# Patient Record
Sex: FEMALE | Race: WHITE | NOT HISPANIC OR LATINO | Employment: UNEMPLOYED | URBAN - METROPOLITAN AREA
[De-identification: names, ages, dates, MRNs, and addresses within clinical notes are randomized per-mention and may not be internally consistent; named-entity substitution may affect disease eponyms.]

---

## 2018-08-27 ENCOUNTER — HOSPITAL ENCOUNTER (INPATIENT)
Facility: MEDICAL CENTER | Age: 43
LOS: 2 days | DRG: 281 | End: 2018-08-29
Attending: EMERGENCY MEDICINE | Admitting: HOSPITALIST
Payer: COMMERCIAL

## 2018-08-27 DIAGNOSIS — R73.9 HYPERGLYCEMIA: ICD-10-CM

## 2018-08-27 DIAGNOSIS — I21.4 NSTEMI (NON-ST ELEVATED MYOCARDIAL INFARCTION) (HCC): ICD-10-CM

## 2018-08-27 PROBLEM — Z89.519 HX OF BKA (HCC): Status: ACTIVE | Noted: 2018-08-27

## 2018-08-27 PROBLEM — E11.65 TYPE 2 DIABETES MELLITUS WITH HYPERGLYCEMIA (HCC): Status: ACTIVE | Noted: 2018-08-27

## 2018-08-27 PROBLEM — N18.9 CKD (CHRONIC KIDNEY DISEASE): Status: ACTIVE | Noted: 2018-08-27

## 2018-08-27 PROBLEM — I25.10 CAD (CORONARY ARTERY DISEASE): Status: ACTIVE | Noted: 2018-08-27

## 2018-08-27 PROBLEM — I10 ESSENTIAL HYPERTENSION: Status: ACTIVE | Noted: 2018-08-27

## 2018-08-27 PROBLEM — R07.9 CHEST PAIN: Status: ACTIVE | Noted: 2018-08-27

## 2018-08-27 PROBLEM — E11.9 DM (DIABETES MELLITUS) (HCC): Status: ACTIVE | Noted: 2018-08-27

## 2018-08-27 LAB
ANION GAP SERPL CALC-SCNC: 10 MMOL/L (ref 0–11.9)
BNP SERPL-MCNC: 607 PG/ML (ref 0–100)
BUN SERPL-MCNC: 19 MG/DL (ref 8–22)
CALCIUM SERPL-MCNC: 7.6 MG/DL (ref 8.5–10.5)
CHLORIDE SERPL-SCNC: 101 MMOL/L (ref 96–112)
CO2 SERPL-SCNC: 23 MMOL/L (ref 20–33)
CREAT SERPL-MCNC: 1.25 MG/DL (ref 0.5–1.4)
EKG IMPRESSION: NORMAL
GLUCOSE SERPL-MCNC: 329 MG/DL (ref 65–99)
POTASSIUM SERPL-SCNC: 4.9 MMOL/L (ref 3.6–5.5)
SODIUM SERPL-SCNC: 134 MMOL/L (ref 135–145)
TROPONIN I SERPL-MCNC: 0.16 NG/ML (ref 0–0.04)
TROPONIN I SERPL-MCNC: 0.17 NG/ML (ref 0–0.04)

## 2018-08-27 PROCEDURE — 304561 HCHG STAT O2

## 2018-08-27 PROCEDURE — 96374 THER/PROPH/DIAG INJ IV PUSH: CPT

## 2018-08-27 PROCEDURE — 93005 ELECTROCARDIOGRAM TRACING: CPT | Performed by: EMERGENCY MEDICINE

## 2018-08-27 PROCEDURE — 83880 ASSAY OF NATRIURETIC PEPTIDE: CPT

## 2018-08-27 PROCEDURE — 80048 BASIC METABOLIC PNL TOTAL CA: CPT

## 2018-08-27 PROCEDURE — 700111 HCHG RX REV CODE 636 W/ 250 OVERRIDE (IP): Performed by: EMERGENCY MEDICINE

## 2018-08-27 PROCEDURE — 99285 EMERGENCY DEPT VISIT HI MDM: CPT

## 2018-08-27 PROCEDURE — 96375 TX/PRO/DX INJ NEW DRUG ADDON: CPT

## 2018-08-27 PROCEDURE — 770020 HCHG ROOM/CARE - TELE (206)

## 2018-08-27 PROCEDURE — 99222 1ST HOSP IP/OBS MODERATE 55: CPT | Performed by: HOSPITALIST

## 2018-08-27 PROCEDURE — 84484 ASSAY OF TROPONIN QUANT: CPT

## 2018-08-27 PROCEDURE — 36415 COLL VENOUS BLD VENIPUNCTURE: CPT

## 2018-08-27 RX ORDER — ASPIRIN 300 MG/1
300 SUPPOSITORY RECTAL DAILY
Status: DISCONTINUED | OUTPATIENT
Start: 2018-08-28 | End: 2018-08-28

## 2018-08-27 RX ORDER — BISACODYL 10 MG
10 SUPPOSITORY, RECTAL RECTAL
Status: DISCONTINUED | OUTPATIENT
Start: 2018-08-27 | End: 2018-08-29 | Stop reason: HOSPADM

## 2018-08-27 RX ORDER — ACETAMINOPHEN 325 MG/1
650 TABLET ORAL EVERY 6 HOURS PRN
Status: DISCONTINUED | OUTPATIENT
Start: 2018-08-27 | End: 2018-08-29 | Stop reason: HOSPADM

## 2018-08-27 RX ORDER — SODIUM CHLORIDE 9 MG/ML
INJECTION, SOLUTION INTRAVENOUS CONTINUOUS
Status: DISCONTINUED | OUTPATIENT
Start: 2018-08-27 | End: 2018-08-29

## 2018-08-27 RX ORDER — ONDANSETRON 2 MG/ML
4 INJECTION INTRAMUSCULAR; INTRAVENOUS ONCE
Status: COMPLETED | OUTPATIENT
Start: 2018-08-27 | End: 2018-08-27

## 2018-08-27 RX ORDER — PROMETHAZINE HYDROCHLORIDE 25 MG/1
12.5-25 SUPPOSITORY RECTAL EVERY 4 HOURS PRN
Status: DISCONTINUED | OUTPATIENT
Start: 2018-08-27 | End: 2018-08-29 | Stop reason: HOSPADM

## 2018-08-27 RX ORDER — POLYETHYLENE GLYCOL 3350 17 G/17G
1 POWDER, FOR SOLUTION ORAL
Status: DISCONTINUED | OUTPATIENT
Start: 2018-08-27 | End: 2018-08-29 | Stop reason: HOSPADM

## 2018-08-27 RX ORDER — ASPIRIN 81 MG/1
324 TABLET, CHEWABLE ORAL DAILY
Status: DISCONTINUED | OUTPATIENT
Start: 2018-08-28 | End: 2018-08-28

## 2018-08-27 RX ORDER — ONDANSETRON 2 MG/ML
4 INJECTION INTRAMUSCULAR; INTRAVENOUS EVERY 4 HOURS PRN
Status: DISCONTINUED | OUTPATIENT
Start: 2018-08-27 | End: 2018-08-29 | Stop reason: HOSPADM

## 2018-08-27 RX ORDER — AMOXICILLIN 250 MG
2 CAPSULE ORAL 2 TIMES DAILY
Status: DISCONTINUED | OUTPATIENT
Start: 2018-08-27 | End: 2018-08-29 | Stop reason: HOSPADM

## 2018-08-27 RX ORDER — ONDANSETRON 4 MG/1
4 TABLET, ORALLY DISINTEGRATING ORAL EVERY 4 HOURS PRN
Status: DISCONTINUED | OUTPATIENT
Start: 2018-08-27 | End: 2018-08-29 | Stop reason: HOSPADM

## 2018-08-27 RX ORDER — MORPHINE SULFATE 4 MG/ML
4 INJECTION, SOLUTION INTRAMUSCULAR; INTRAVENOUS ONCE
Status: COMPLETED | OUTPATIENT
Start: 2018-08-27 | End: 2018-08-27

## 2018-08-27 RX ORDER — ASPIRIN 325 MG
325 TABLET ORAL DAILY
Status: DISCONTINUED | OUTPATIENT
Start: 2018-08-28 | End: 2018-08-28

## 2018-08-27 RX ORDER — PROMETHAZINE HYDROCHLORIDE 25 MG/1
12.5-25 TABLET ORAL EVERY 4 HOURS PRN
Status: DISCONTINUED | OUTPATIENT
Start: 2018-08-27 | End: 2018-08-29 | Stop reason: HOSPADM

## 2018-08-27 RX ORDER — CLONIDINE HYDROCHLORIDE 0.1 MG/1
0.1 TABLET ORAL EVERY 6 HOURS PRN
Status: DISCONTINUED | OUTPATIENT
Start: 2018-08-27 | End: 2018-08-29 | Stop reason: HOSPADM

## 2018-08-27 RX ORDER — NICOTINE 21 MG/24HR
21 PATCH, TRANSDERMAL 24 HOURS TRANSDERMAL
Status: DISCONTINUED | OUTPATIENT
Start: 2018-08-28 | End: 2018-08-29 | Stop reason: HOSPADM

## 2018-08-27 RX ADMIN — ONDANSETRON 4 MG: 2 INJECTION INTRAMUSCULAR; INTRAVENOUS at 20:45

## 2018-08-27 RX ADMIN — MORPHINE SULFATE 4 MG: 4 INJECTION INTRAVENOUS at 20:44

## 2018-08-27 ASSESSMENT — COGNITIVE AND FUNCTIONAL STATUS - GENERAL
MOVING FROM LYING ON BACK TO SITTING ON SIDE OF FLAT BED: A LITTLE
SUGGESTED CMS G CODE MODIFIER DAILY ACTIVITY: CH
STANDING UP FROM CHAIR USING ARMS: A LITTLE
MOBILITY SCORE: 16
WALKING IN HOSPITAL ROOM: TOTAL
DAILY ACTIVITIY SCORE: 24
SUGGESTED CMS G CODE MODIFIER MOBILITY: CK
CLIMB 3 TO 5 STEPS WITH RAILING: TOTAL

## 2018-08-27 ASSESSMENT — ENCOUNTER SYMPTOMS
NEUROLOGICAL NEGATIVE: 1
RESPIRATORY NEGATIVE: 1
EYES NEGATIVE: 1
CONSTITUTIONAL NEGATIVE: 1
MUSCULOSKELETAL NEGATIVE: 1
PSYCHIATRIC NEGATIVE: 1
GASTROINTESTINAL NEGATIVE: 1

## 2018-08-27 ASSESSMENT — PATIENT HEALTH QUESTIONNAIRE - PHQ9
SUM OF ALL RESPONSES TO PHQ9 QUESTIONS 1 AND 2: 2
6. FEELING BAD ABOUT YOURSELF - OR THAT YOU ARE A FAILURE OR HAVE LET YOURSELF OR YOUR FAMILY DOWN: MORE THAN HALF THE DAYS
4. FEELING TIRED OR HAVING LITTLE ENERGY: MORE THAN HALF THE DAYS
2. FEELING DOWN, DEPRESSED, IRRITABLE, OR HOPELESS: SEVERAL DAYS
7. TROUBLE CONCENTRATING ON THINGS, SUCH AS READING THE NEWSPAPER OR WATCHING TELEVISION: MORE THAN HALF THE DAYS
1. LITTLE INTEREST OR PLEASURE IN DOING THINGS: SEVERAL DAYS
9. THOUGHTS THAT YOU WOULD BE BETTER OFF DEAD, OR OF HURTING YOURSELF: NOT AT ALL
SUM OF ALL RESPONSES TO PHQ QUESTIONS 1-9: 12
5. POOR APPETITE OR OVEREATING: MORE THAN HALF THE DAYS
8. MOVING OR SPEAKING SO SLOWLY THAT OTHER PEOPLE COULD HAVE NOTICED. OR THE OPPOSITE, BEING SO FIGETY OR RESTLESS THAT YOU HAVE BEEN MOVING AROUND A LOT MORE THAN USUAL: NOT AT ALL
3. TROUBLE FALLING OR STAYING ASLEEP OR SLEEPING TOO MUCH: MORE THAN HALF THE DAYS

## 2018-08-27 ASSESSMENT — PAIN SCALES - GENERAL
PAINLEVEL_OUTOF10: 5
PAINLEVEL_OUTOF10: 5

## 2018-08-27 ASSESSMENT — LIFESTYLE VARIABLES
EVER_SMOKED: YES
DO YOU DRINK ALCOHOL: NO

## 2018-08-28 ENCOUNTER — APPOINTMENT (OUTPATIENT)
Dept: RADIOLOGY | Facility: MEDICAL CENTER | Age: 43
DRG: 281 | End: 2018-08-28
Attending: INTERNAL MEDICINE
Payer: COMMERCIAL

## 2018-08-28 PROBLEM — I50.22 CHRONIC SYSTOLIC CHF (CONGESTIVE HEART FAILURE) (HCC): Status: ACTIVE | Noted: 2018-08-28

## 2018-08-28 PROBLEM — N18.30 CKD (CHRONIC KIDNEY DISEASE) STAGE 3, GFR 30-59 ML/MIN: Status: ACTIVE | Noted: 2018-08-27

## 2018-08-28 PROBLEM — I21.4 NSTEMI (NON-ST ELEVATED MYOCARDIAL INFARCTION) (HCC): Status: ACTIVE | Noted: 2018-08-27

## 2018-08-28 LAB
ANION GAP SERPL CALC-SCNC: 5 MMOL/L (ref 0–11.9)
BASOPHILS # BLD AUTO: 0.3 % (ref 0–1.8)
BASOPHILS # BLD: 0.02 K/UL (ref 0–0.12)
BUN SERPL-MCNC: 23 MG/DL (ref 8–22)
CALCIUM SERPL-MCNC: 7.3 MG/DL (ref 8.5–10.5)
CHLORIDE SERPL-SCNC: 103 MMOL/L (ref 96–112)
CHOLEST SERPL-MCNC: 132 MG/DL (ref 100–199)
CO2 SERPL-SCNC: 24 MMOL/L (ref 20–33)
CREAT SERPL-MCNC: 1.48 MG/DL (ref 0.5–1.4)
DEPRECATED D DIMER PPP IA-ACNC: <200 NG/ML(D-DU)
EOSINOPHIL # BLD AUTO: 0.28 K/UL (ref 0–0.51)
EOSINOPHIL NFR BLD: 4 % (ref 0–6.9)
ERYTHROCYTE [DISTWIDTH] IN BLOOD BY AUTOMATED COUNT: 58.4 FL (ref 35.9–50)
EST. AVERAGE GLUCOSE BLD GHB EST-MCNC: 223 MG/DL
GLUCOSE BLD-MCNC: 133 MG/DL (ref 65–99)
GLUCOSE BLD-MCNC: 149 MG/DL (ref 65–99)
GLUCOSE BLD-MCNC: 237 MG/DL (ref 65–99)
GLUCOSE BLD-MCNC: 274 MG/DL (ref 65–99)
GLUCOSE SERPL-MCNC: 377 MG/DL (ref 65–99)
HBA1C MFR BLD: 9.4 % (ref 0–5.6)
HCT VFR BLD AUTO: 44.3 % (ref 37–47)
HDLC SERPL-MCNC: 32 MG/DL
HGB BLD-MCNC: 13.3 G/DL (ref 12–16)
IMM GRANULOCYTES # BLD AUTO: 0.05 K/UL (ref 0–0.11)
IMM GRANULOCYTES NFR BLD AUTO: 0.7 % (ref 0–0.9)
LDLC SERPL CALC-MCNC: 69 MG/DL
LV EJECT FRACT  99904: 35
LV EJECT FRACT MOD 2C 99903: 43.15
LV EJECT FRACT MOD 4C 99902: 48.57
LV EJECT FRACT MOD BP 99901: 47.02
LYMPHOCYTES # BLD AUTO: 2.03 K/UL (ref 1–4.8)
LYMPHOCYTES NFR BLD: 28.8 % (ref 22–41)
MCH RBC QN AUTO: 26.7 PG (ref 27–33)
MCHC RBC AUTO-ENTMCNC: 30 G/DL (ref 33.6–35)
MCV RBC AUTO: 88.8 FL (ref 81.4–97.8)
MONOCYTES # BLD AUTO: 0.36 K/UL (ref 0–0.85)
MONOCYTES NFR BLD AUTO: 5.1 % (ref 0–13.4)
NEUTROPHILS # BLD AUTO: 4.31 K/UL (ref 2–7.15)
NEUTROPHILS NFR BLD: 61.1 % (ref 44–72)
NRBC # BLD AUTO: 0 K/UL
NRBC BLD-RTO: 0 /100 WBC
PLATELET # BLD AUTO: 204 K/UL (ref 164–446)
PMV BLD AUTO: 9.8 FL (ref 9–12.9)
POTASSIUM SERPL-SCNC: 4.9 MMOL/L (ref 3.6–5.5)
RBC # BLD AUTO: 4.99 M/UL (ref 4.2–5.4)
SODIUM SERPL-SCNC: 132 MMOL/L (ref 135–145)
TRIGL SERPL-MCNC: 157 MG/DL (ref 0–149)
TROPONIN I SERPL-MCNC: 0.09 NG/ML (ref 0–0.04)
TROPONIN I SERPL-MCNC: 0.11 NG/ML (ref 0–0.04)
WBC # BLD AUTO: 7.1 K/UL (ref 4.8–10.8)

## 2018-08-28 PROCEDURE — 36415 COLL VENOUS BLD VENIPUNCTURE: CPT

## 2018-08-28 PROCEDURE — 770020 HCHG ROOM/CARE - TELE (206)

## 2018-08-28 PROCEDURE — 80061 LIPID PANEL: CPT

## 2018-08-28 PROCEDURE — 700105 HCHG RX REV CODE 258: Performed by: HOSPITALIST

## 2018-08-28 PROCEDURE — 700111 HCHG RX REV CODE 636 W/ 250 OVERRIDE (IP)

## 2018-08-28 PROCEDURE — 700102 HCHG RX REV CODE 250 W/ 637 OVERRIDE(OP): Performed by: HOSPITALIST

## 2018-08-28 PROCEDURE — 80048 BASIC METABOLIC PNL TOTAL CA: CPT

## 2018-08-28 PROCEDURE — A9270 NON-COVERED ITEM OR SERVICE: HCPCS | Performed by: INTERNAL MEDICINE

## 2018-08-28 PROCEDURE — 93010 ELECTROCARDIOGRAM REPORT: CPT | Performed by: INTERNAL MEDICINE

## 2018-08-28 PROCEDURE — 93308 TTE F-UP OR LMTD: CPT | Mod: 26 | Performed by: INTERNAL MEDICINE

## 2018-08-28 PROCEDURE — 83036 HEMOGLOBIN GLYCOSYLATED A1C: CPT

## 2018-08-28 PROCEDURE — 700102 HCHG RX REV CODE 250 W/ 637 OVERRIDE(OP): Performed by: INTERNAL MEDICINE

## 2018-08-28 PROCEDURE — 82962 GLUCOSE BLOOD TEST: CPT | Mod: 91

## 2018-08-28 PROCEDURE — 85379 FIBRIN DEGRADATION QUANT: CPT

## 2018-08-28 PROCEDURE — A9502 TC99M TETROFOSMIN: HCPCS

## 2018-08-28 PROCEDURE — 84484 ASSAY OF TROPONIN QUANT: CPT

## 2018-08-28 PROCEDURE — 93005 ELECTROCARDIOGRAM TRACING: CPT | Performed by: HOSPITALIST

## 2018-08-28 PROCEDURE — 99233 SBSQ HOSP IP/OBS HIGH 50: CPT | Performed by: INTERNAL MEDICINE

## 2018-08-28 PROCEDURE — 700111 HCHG RX REV CODE 636 W/ 250 OVERRIDE (IP): Performed by: HOSPITALIST

## 2018-08-28 PROCEDURE — 85025 COMPLETE CBC W/AUTO DIFF WBC: CPT

## 2018-08-28 PROCEDURE — A9270 NON-COVERED ITEM OR SERVICE: HCPCS | Performed by: HOSPITALIST

## 2018-08-28 PROCEDURE — 93306 TTE W/DOPPLER COMPLETE: CPT

## 2018-08-28 RX ORDER — REGADENOSON 0.08 MG/ML
INJECTION, SOLUTION INTRAVENOUS
Status: COMPLETED
Start: 2018-08-28 | End: 2018-08-28

## 2018-08-28 RX ORDER — INSULIN GLARGINE 100 [IU]/ML
13 INJECTION, SOLUTION SUBCUTANEOUS
COMMUNITY

## 2018-08-28 RX ORDER — HYDROCODONE BITARTRATE AND ACETAMINOPHEN 5; 325 MG/1; MG/1
1 TABLET ORAL 3 TIMES DAILY PRN
Status: DISCONTINUED | OUTPATIENT
Start: 2018-08-28 | End: 2018-08-29 | Stop reason: HOSPADM

## 2018-08-28 RX ORDER — ATORVASTATIN CALCIUM 20 MG/1
20 TABLET, FILM COATED ORAL EVERY MORNING
Status: DISCONTINUED | OUTPATIENT
Start: 2018-08-28 | End: 2018-08-29 | Stop reason: HOSPADM

## 2018-08-28 RX ORDER — MORPHINE SULFATE 4 MG/ML
1-2 INJECTION, SOLUTION INTRAMUSCULAR; INTRAVENOUS
Status: DISCONTINUED | OUTPATIENT
Start: 2018-08-28 | End: 2018-08-29 | Stop reason: HOSPADM

## 2018-08-28 RX ORDER — ATORVASTATIN CALCIUM 20 MG/1
20 TABLET, FILM COATED ORAL EVERY MORNING
COMMUNITY

## 2018-08-28 RX ORDER — CARVEDILOL 12.5 MG/1
12.5 TABLET ORAL 2 TIMES DAILY WITH MEALS
Status: DISCONTINUED | OUTPATIENT
Start: 2018-08-28 | End: 2018-08-29 | Stop reason: HOSPADM

## 2018-08-28 RX ORDER — RISPERIDONE 2 MG/1
4-5 TABLET ORAL SEE ADMIN INSTRUCTIONS
COMMUNITY

## 2018-08-28 RX ORDER — LISINOPRIL 5 MG/1
5 TABLET ORAL
Status: DISCONTINUED | OUTPATIENT
Start: 2018-08-29 | End: 2018-08-29 | Stop reason: HOSPADM

## 2018-08-28 RX ORDER — RISPERIDONE 1 MG/1
3 TABLET ORAL EVERY EVENING
Status: DISCONTINUED | OUTPATIENT
Start: 2018-08-28 | End: 2018-08-29 | Stop reason: HOSPADM

## 2018-08-28 RX ORDER — SPIRONOLACTONE 25 MG/1
25 TABLET ORAL
Status: DISCONTINUED | OUTPATIENT
Start: 2018-08-29 | End: 2018-08-29 | Stop reason: HOSPADM

## 2018-08-28 RX ORDER — AMITRIPTYLINE HYDROCHLORIDE 50 MG/1
100 TABLET, FILM COATED ORAL EVERY EVENING
Status: DISCONTINUED | OUTPATIENT
Start: 2018-08-28 | End: 2018-08-28

## 2018-08-28 RX ORDER — GABAPENTIN 300 MG/1
600 CAPSULE ORAL 3 TIMES DAILY
Status: DISCONTINUED | OUTPATIENT
Start: 2018-08-28 | End: 2018-08-29 | Stop reason: HOSPADM

## 2018-08-28 RX ORDER — HYDROCODONE BITARTRATE AND ACETAMINOPHEN 5; 325 MG/1; MG/1
1 TABLET ORAL 3 TIMES DAILY PRN
COMMUNITY

## 2018-08-28 RX ORDER — CLOPIDOGREL BISULFATE 75 MG/1
75 TABLET ORAL EVERY MORNING
COMMUNITY

## 2018-08-28 RX ORDER — NITROGLYCERIN 0.4 MG/1
0.4 TABLET SUBLINGUAL
Status: DISCONTINUED | OUTPATIENT
Start: 2018-08-28 | End: 2018-08-29 | Stop reason: HOSPADM

## 2018-08-28 RX ORDER — MORPHINE SULFATE 4 MG/ML
2 INJECTION, SOLUTION INTRAMUSCULAR; INTRAVENOUS EVERY 4 HOURS PRN
Status: DISCONTINUED | OUTPATIENT
Start: 2018-08-28 | End: 2018-08-29 | Stop reason: HOSPADM

## 2018-08-28 RX ORDER — FUROSEMIDE 20 MG/1
20 TABLET ORAL 2 TIMES DAILY
Status: DISCONTINUED | OUTPATIENT
Start: 2018-08-28 | End: 2018-08-29 | Stop reason: HOSPADM

## 2018-08-28 RX ORDER — INSULIN GLARGINE 100 [IU]/ML
13 INJECTION, SOLUTION SUBCUTANEOUS
Status: DISCONTINUED | OUTPATIENT
Start: 2018-08-28 | End: 2018-08-29 | Stop reason: HOSPADM

## 2018-08-28 RX ORDER — FLUOXETINE HYDROCHLORIDE 20 MG/1
20 CAPSULE ORAL EVERY MORNING
Status: DISCONTINUED | OUTPATIENT
Start: 2018-08-28 | End: 2018-08-29 | Stop reason: HOSPADM

## 2018-08-28 RX ORDER — RISPERIDONE 1 MG/1
2-3 TABLET ORAL SEE ADMIN INSTRUCTIONS
COMMUNITY

## 2018-08-28 RX ORDER — RISPERIDONE 1 MG/1
4-5 TABLET ORAL EVERY EVENING
Status: DISCONTINUED | OUTPATIENT
Start: 2018-08-28 | End: 2018-08-28

## 2018-08-28 RX ORDER — AMITRIPTYLINE HYDROCHLORIDE 50 MG/1
100 TABLET, FILM COATED ORAL EVERY EVENING
Status: DISCONTINUED | OUTPATIENT
Start: 2018-08-28 | End: 2018-08-29 | Stop reason: HOSPADM

## 2018-08-28 RX ORDER — FUROSEMIDE 20 MG/1
20 TABLET ORAL 2 TIMES DAILY
COMMUNITY

## 2018-08-28 RX ORDER — CLOPIDOGREL BISULFATE 75 MG/1
75 TABLET ORAL EVERY MORNING
Status: DISCONTINUED | OUTPATIENT
Start: 2018-08-28 | End: 2018-08-29 | Stop reason: HOSPADM

## 2018-08-28 RX ORDER — GABAPENTIN 600 MG/1
600 TABLET ORAL 3 TIMES DAILY
COMMUNITY

## 2018-08-28 RX ORDER — FLUOXETINE HYDROCHLORIDE 20 MG/1
20 CAPSULE ORAL EVERY MORNING
COMMUNITY

## 2018-08-28 RX ORDER — DEXTROSE MONOHYDRATE 25 G/50ML
25 INJECTION, SOLUTION INTRAVENOUS
Status: DISCONTINUED | OUTPATIENT
Start: 2018-08-28 | End: 2018-08-29 | Stop reason: HOSPADM

## 2018-08-28 RX ADMIN — DOCUSATE SODIUM -SENNOSIDES 2 TABLET: 50; 8.6 TABLET, COATED ORAL at 17:43

## 2018-08-28 RX ADMIN — HYDROCODONE BITARTRATE AND ACETAMINOPHEN 1 TABLET: 5; 325 TABLET ORAL at 23:55

## 2018-08-28 RX ADMIN — CLOPIDOGREL 75 MG: 75 TABLET, FILM COATED ORAL at 17:35

## 2018-08-28 RX ADMIN — MORPHINE SULFATE 2 MG: 4 INJECTION INTRAVENOUS at 01:19

## 2018-08-28 RX ADMIN — FLUOXETINE HYDROCHLORIDE 20 MG: 20 CAPSULE ORAL at 17:35

## 2018-08-28 RX ADMIN — QUETIAPINE FUMARATE 300 MG: 100 TABLET ORAL at 01:36

## 2018-08-28 RX ADMIN — ATORVASTATIN CALCIUM 20 MG: 20 TABLET, FILM COATED ORAL at 17:35

## 2018-08-28 RX ADMIN — ACETAMINOPHEN 650 MG: 325 TABLET, FILM COATED ORAL at 12:45

## 2018-08-28 RX ADMIN — HYDROCODONE BITARTRATE AND ACETAMINOPHEN 1 TABLET: 5; 325 TABLET ORAL at 19:09

## 2018-08-28 RX ADMIN — AMITRIPTYLINE HYDROCHLORIDE 100 MG: 50 TABLET, FILM COATED ORAL at 21:01

## 2018-08-28 RX ADMIN — ENOXAPARIN SODIUM 30 MG: 100 INJECTION SUBCUTANEOUS at 05:10

## 2018-08-28 RX ADMIN — REGADENOSON 0.4 MG: 0.08 INJECTION, SOLUTION INTRAVENOUS at 15:04

## 2018-08-28 RX ADMIN — AMITRIPTYLINE HYDROCHLORIDE 100 MG: 50 TABLET, FILM COATED ORAL at 01:31

## 2018-08-28 RX ADMIN — INSULIN HUMAN 3 UNITS: 100 INJECTION, SOLUTION PARENTERAL at 23:05

## 2018-08-28 RX ADMIN — HYDROCODONE BITARTRATE AND ACETAMINOPHEN 1 TABLET: 5; 325 TABLET ORAL at 15:54

## 2018-08-28 RX ADMIN — NICOTINE 21 MG: 21 PATCH, EXTENDED RELEASE TRANSDERMAL at 05:10

## 2018-08-28 RX ADMIN — FUROSEMIDE 20 MG: 20 TABLET ORAL at 17:35

## 2018-08-28 RX ADMIN — RISPERIDONE 3 MG: 1 TABLET, FILM COATED ORAL at 17:33

## 2018-08-28 RX ADMIN — GABAPENTIN 600 MG: 300 CAPSULE ORAL at 17:33

## 2018-08-28 RX ADMIN — SODIUM CHLORIDE: 9 INJECTION, SOLUTION INTRAVENOUS at 00:14

## 2018-08-28 RX ADMIN — INSULIN GLARGINE 13 UNITS: 100 INJECTION, SOLUTION SUBCUTANEOUS at 21:01

## 2018-08-28 RX ADMIN — CARVEDILOL 12.5 MG: 12.5 TABLET, FILM COATED ORAL at 17:43

## 2018-08-28 RX ADMIN — ASPIRIN 325 MG: 325 TABLET, COATED ORAL at 05:10

## 2018-08-28 RX ADMIN — METOPROLOL TARTRATE 25 MG: 25 TABLET, FILM COATED ORAL at 17:33

## 2018-08-28 ASSESSMENT — PAIN SCALES - GENERAL
PAINLEVEL_OUTOF10: 3
PAINLEVEL_OUTOF10: 0
PAINLEVEL_OUTOF10: 5
PAINLEVEL_OUTOF10: 2
PAINLEVEL_OUTOF10: 0
PAINLEVEL_OUTOF10: 0
PAINLEVEL_OUTOF10: 7
PAINLEVEL_OUTOF10: 1
PAINLEVEL_OUTOF10: 7

## 2018-08-28 NOTE — ASSESSMENT & PLAN NOTE
-Does not appear to be fluid overloaded.  Unknown systolic function, as no previous echocardiogram in the system.  -Check echocardiogram.  I will get cardiology to see her as well.  Resume home dose Lasix 20 mg twice a day along with metoprolol.  May need to be started on ACE inhibitor/ARB to optimize.  -Further cardiac workup for chest as above.  -Continue to monitor on telemetry.  Intake and output monitoring, and daily weights.

## 2018-08-28 NOTE — PROGRESS NOTES
Med rec completed per pt's caregiver Nicholas [phone].     Comments: Pharmacist notified.

## 2018-08-28 NOTE — ED PROVIDER NOTES
ED Provider Note    HPI: Patient is a 42-year-old female who presented to the emergency department August 27, 2018 at 8 PM with a chief complaint of chest pain.    Patient began having chest pain the previous evening. Today she is developed some pain in the left arm which radiates into her neck. The pain seems to calm and go every 15-20 minutes or so. Patient also has had some nausea and diaphoresis. 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 She also has a previous history of sepsis and osteomyelitis. She has coronary artery bypass grafting ×4 done in 2014 as had bilateral below-the-knee amputations no fever no chills no cough no vomiting. No other somatic complaints    Review of Systems: Positive for chest pain with radiation into the neck and arm negative for fever chills cough or vomiting. Review of systems reviewed with patient, all other systems negative    Past medical/surgical history: IV CVA coronary artery bypass grafting osteomyelitis    Medications: Insulin and Tylenol. The same Risperdal amitriptyline Norco fluoxetine    Allergies: None.     Social History:   Patient smoked one pack of cigarettes per day no alcohol use      Physical exam: Constitutional: Slender female awake alert  Vital signs: Temperature 97.4 pulse 72 respiration 16 blood pressure 156/93 pulse oximetry 100%  EYES: PERRL, EOMI, Conjunctivae and sclera normal, eyelids normal bilaterally.  Neck: Trachea midline. No cervical masses seen or palpated. Normal range of motion, supple. No meningeal signs elicited.  Cardiac: Regular rate and rhythm. S1-S2 present. No S3 or S4 present. No murmurs, rubs, or gallops heard. No edema or varicosities were seen.   Lungs: Clear to auscultation with good aeration throughout. No wheezes, rales, or rhonchi heard. Patient's chest wall moved symmetrically with each respiratory effort. Patient was not making use of accessory muscles of respiration in  breathing.  Abdomen: Soft nontender to palpation. No rebound or guarding elicited. No organomegaly identified. No pulsatile abdominal masses identified.   Musculoskeletal: BK bilaterally. Stumps look okay.  Neurologic: alert and awake answers questions appropriately. Moves all four extremities independently, no gross focal abnormalities identified. Normal strength and motor.  Skin: no rash or lesion seen, no palpable dermatologic lesions identified.  Psychiatric: Patient appeared to be slightly anxious. She was not delusional or hallucinating.    Medical decision making:  I reviewed the studies from the other facility. Significant findings included unremarkable CBC and elevated troponin of 0.11 minimally elevated creatinine 1.5 for that I suspect is due to the patient's diabetes. Blood sugar moderately elevated at 408. Pseudohyponatremia is present with a sodium of 133.    EKG obtained (interpretation) 12-lead EKG sinus rhythm. Morphology P waves unremarkable. QRS configuration consistent with LVH is present. Diffuse T-wave changes are present but I felt were likely secondary to LVH. No obvious ST elevation or depression. Interpreted as an abnormal EKG but not indicating acute ischemia or dysrhythmia    Laboratory studies obtained (please see lab sheet for all results) significant findings included moderate hyperglycemia blood sugar 329. Troponin elevated at 0.16 BNP elevated at 607.    Patient admitted by hospitalist service. She appears to have a non-STEMI. She is also hyperglycemic. Patient appears to be reasonably stable at this time. Her signs and symptoms did not seem to go along with either aortic disease or pulmonary embolism    Impression 1) non-STEMI  Normal 2)  hyperglycemia

## 2018-08-28 NOTE — ASSESSMENT & PLAN NOTE
-No previous creatinines in the system.  Unknown baseline.  - avoid nephrotoxins, and continue to renally dose all medications.

## 2018-08-28 NOTE — ASSESSMENT & PLAN NOTE
-History of CABG.   -Resume home dose statin, Lopressor, aspirin, and plavix.    -Check echocardiogram.  -Monitor on telemetry.

## 2018-08-28 NOTE — ASSESSMENT & PLAN NOTE
-Resume home dose Lantus 13 units at at bedtime, and start sliding scale insulin coverage.  Check hemoglobin A1c.  Accu-Chek before meals and at bedtime.  Diabetic diet.

## 2018-08-28 NOTE — ED TRIAGE NOTES
BIBA. Transfer from [facility]. Pt has been having chest pain since last night at 2100. NSTEMI transfer

## 2018-08-28 NOTE — DISCHARGE PLANNING
Admitted for CP and troponins. Pt states she was in [place] a few years ago and they were in placing stents when she had a stroke per pt. PT states they never placed stents due to stroke and she never followed up. Pt back from Stress test and Echo both pending. Per hospitalist cardiology consulted.     Pt is bilateral  BKA.

## 2018-08-28 NOTE — ASSESSMENT & PLAN NOTE
-Good control.  Resume home dose Lopressor, and Lasix.  -Monitor blood pressure trend closely, with as needed oral Catapres for significant hypertension.

## 2018-08-28 NOTE — PROGRESS NOTES
Patient updated on plan of care, bed in low & locked position, call light within reach, no needs voiced.

## 2018-08-28 NOTE — DIETARY
"DX: Chest pain  NSTEMI (non-ST elevated myocardial infarction) (HCC)  Hx of diabetes mellitus, CAD s/p bypass grafting, hypertension, CHF, MI.    BMI - Pt seen for BMI <19 (=17), ht=61\", wt=41 kg. Low BMI  Pt is out of room to stress lab; unavailable for interview at this time.   Diet: NPO  Labs: sodium 132, glucose 377, BUN 23, creatinine 1.48, Hbg a1c 9.4, triglycerides 157   Meds: Lasix, Lantus, regular insulin, risperdal, senna-docusate.   +BM today  No wounds, per ADL's.     PLAN/ RECOMMEND - ADAT to Diabetic diet when medically feasible.  RD to monitor for diet advancement and adequate PO intake.     "

## 2018-08-28 NOTE — PROGRESS NOTES
[facility] Hospitalist Progress Note    Date of Service: 2018    Chief Complaint  42 y.o. female with history of CHF, history of stroke CAD s/p CABG, type 2 diabetes mellitus, hypertension, admitted 2018 with chest pain.  Had an indeterminate troponin at an outside facility.  Labs here show troponin of 0.16, sodium of 134, blood glucose of 329, BNP of 607.  EKG showed LVH, diffuse T-wave changes felt to be due to LVH, with no ST elevation or depression, and no acute ischemia.  Further cardiac workup pursued.    Interval Problem Update  2018 - no overnight events. Remains hemodynamically stable and afebrile. Stable on 2L O2 NC.  Troponin peaked at 0.17, and trended down.  Creatinine 1.48.    > Seen and examined. (+) left sided chest pain, has persisted since yesterday, rated 3/10. Radiating to left arm and neck. (+) nausea, no vomiting. No SOB. No abd pain, oral brash.       Consultants/Specialty  Cardiology.    Disposition  Monitor on telemetry.        ROS     Pertinent positives/negatives as mentioned above.     A complete review of systems was done. All other systems were negative.      Physical Exam  Laboratory/Imaging   Hemodynamics  Temp (24hrs), Av.2 °C (97.1 °F), Min:36 °C (96.8 °F), Max:36.3 °C (97.4 °F)   Temperature: 36.2 °C (97.2 °F)  Pulse  Av  Min: 70  Max: 80 Heart Rate (Monitored): 71  Blood Pressure: 109/70, NIBP: 125/77      Respiratory      Respiration: 18, Pulse Oximetry: 90 %        RUL Breath Sounds: Clear, RML Breath Sounds: Diminished, RLL Breath Sounds: Clear, MARYSE Breath Sounds: Clear, LLL Breath Sounds: Diminished    Fluids    Intake/Output Summary (Last 24 hours) at 18 0849  Last data filed at 18 0000   Gross per 24 hour   Intake              200 ml   Output                0 ml   Net              200 ml       Nutrition  Orders Placed This Encounter   Procedures   • Diet NPO     Standing Status:   Standing     Number of Occurrences:   8     Order Specific  Question:   Restrict to:     Answer:   Sips with Medications [3]     Physical Exam   Constitutional: She is oriented to person, place, and time. She appears well-developed and well-nourished. No distress.   HENT:   Head: Normocephalic and atraumatic.   Mouth/Throat: No oropharyngeal exudate.   Eyes: Pupils are equal, round, and reactive to light. Conjunctivae are normal. No scleral icterus.   Neck: Normal range of motion. Neck supple.   Cardiovascular: Normal rate and regular rhythm.  Exam reveals no gallop and no friction rub.    No murmur heard.  Pulmonary/Chest: Effort normal and breath sounds normal. No respiratory distress. She has no wheezes. She has no rales. She exhibits no tenderness.   Abdominal: Soft. Bowel sounds are normal. She exhibits no distension. There is no tenderness. There is no rebound and no guarding.   Musculoskeletal: Normal range of motion. She exhibits no tenderness.   (+) B/L BKA.    Lymphadenopathy:     She has no cervical adenopathy.   Neurological: She is alert and oriented to person, place, and time. No cranial nerve deficit.   Skin: Skin is warm and dry. No rash noted. No erythema. No pallor.   Psychiatric:   not very forthcoming. Flat affect.   Nursing note and vitals reviewed.         Recent Labs      08/28/18   0536   WBC  7.1   RBC  4.99   HEMOGLOBIN  13.3   HEMATOCRIT  44.3   MCV  88.8   MCH  26.7*   MCHC  30.0*   RDW  58.4*   PLATELETCT  204   MPV  9.8     Recent Labs      08/27/18 2003 08/28/18   0536   SODIUM  134*  132*   POTASSIUM  4.9  4.9   CHLORIDE  101  103   CO2  23  24   GLUCOSE  329*  377*   BUN  19  23*   CREATININE  1.25  1.48*   CALCIUM  7.6*  7.3*         Recent Labs      08/27/18 2003   BNPBTYPENAT  607*              Assessment/Plan     * NSTEMI (non-ST elevated myocardial infarction) (HCC)- (present on admission)   Assessment & Plan    -Troponin peaked at 0.17, and has trended down. Will proceed with nuclear cardiac stress testing.  I have consulted  cardiology.  -I will obtain an echocardiogram.    -Continue aspirin and Plavix, and lipitor.  Check lipid profile and hemoglobin A1c for further risk stratification.   -Start as needed sublingual nitroglycerin, and morphine for pain recurrence.   -We will do further cardiac monitoring to rule out arrhythmias.          Chronic systolic CHF (congestive heart failure) (HCC)- (present on admission)   Assessment & Plan    -Does not appear to be fluid overloaded.  Unknown systolic function, as no previous echocardiogram in the system.  -Check echocardiogram.  I will get cardiology to see her as well.  Resume home dose Lasix 20 mg twice a day along with metoprolol.  May need to be started on ACE inhibitor/ARB to optimize.  -Further cardiac workup for chest as above.  -Continue to monitor on telemetry.  Intake and output monitoring, and daily weights.        Hx of BKA (HCC)- (present on admission)   Assessment & Plan    - in the setting of prior Diabetic foot infections.  Likely vascular disease as well.          CKD (chronic kidney disease) stage 3, GFR 30-59 ml/min- (present on admission)   Assessment & Plan    -No previous creatinines in the system.  Unknown baseline.  - avoid nephrotoxins, and continue to renally dose all medications.        Essential hypertension- (present on admission)   Assessment & Plan    -Good control.  Resume home dose Lopressor, and Lasix.  -Monitor blood pressure trend closely, with as needed oral Catapres for significant hypertension.        Type 2 diabetes mellitus with hyperglycemia (HCC)- (present on admission)   Assessment & Plan    -Resume home dose Lantus 13 units at at bedtime, and start sliding scale insulin coverage.  Check hemoglobin A1c.  Accu-Chek before meals and at bedtime.  Diabetic diet.        CAD (coronary artery disease)- (present on admission)   Assessment & Plan    -History of CABG.   -Resume home dose statin, Lopressor, aspirin, and plavix.    -Check  echocardiogram.  -Monitor on telemetry.          Quality-Core Measures   Reviewed items::  Labs reviewed, Medications reviewed and Radiology images reviewed  Escobar catheter::  No Escobar  DVT prophylaxis - mechanical:  SCDs

## 2018-08-28 NOTE — ASSESSMENT & PLAN NOTE
-Troponin peaked at 0.17, and has trended down. Will proceed with nuclear cardiac stress testing.  I have consulted cardiology.  -I will obtain an echocardiogram.    -Continue aspirin and Plavix, and lipitor.  Check lipid profile and hemoglobin A1c for further risk stratification.   -Start as needed sublingual nitroglycerin, and morphine for pain recurrence.   -We will do further cardiac monitoring to rule out arrhythmias.

## 2018-08-28 NOTE — H&P
Hospital Medicine History & Physical Note    Date of Service  8/27/2018    Primary Care Physician  No primary care provider on file.    Consultants  none    Code Status  Full code     Chief Complaint  Chest pain    History of Presenting Illness  42 y.o. female with history of diabetes mellitus on unclear medication regimen, coronary artery disease status post bypass grafting, essential hypertension, was apparently in her usual state of health until the day of admission.  She reports the onset of chest pain.  She described as sharp, in the center of her chest without radiation.  No shortness of breath, no exacerbating or alleviating factors.  As the pain continued, she presented for further evaluation.  At an outside facility she had an indeterminate troponin elevation and was transferred here as a non-ST elevated myocardial infarction.    Review of Systems  Review of Systems   Constitutional: Negative.    HENT: Negative.    Eyes: Negative.    Respiratory: Negative.    Cardiovascular: Positive for chest pain.   Gastrointestinal: Negative.    Genitourinary: Negative.    Musculoskeletal: Negative.    Skin: Negative.    Neurological: Negative.    Endo/Heme/Allergies: Negative.    Psychiatric/Behavioral: Negative.        Past Medical History   has a past medical history of Congestive heart failure (HCC); Diabetes (HCC); Hypertension; and Myocardial infarct (HCC).    Surgical History   has a past surgical history that includes other orthopedic surgery and other cardiac surgery.     Family History  family history includes Cancer in his father; Diabetes in his mother; Heart Disease in his mother.     Social History   reports that he has been smoking Cigarettes.  He has been smoking about 1.00 pack per day. He has never used smokeless tobacco. He reports that he does not drink alcohol or use drugs.    Allergies  Allergies   Allergen Reactions   • Fish Allergy    • Sulfa Drugs Hives       Medications  None       Physical  Exam  Blood Pressure: 156/93   Temperature: 36.3 °C (97.4 °F)   Pulse: 79   Respiration: 14   Pulse Oximetry: 92 %     Physical Exam   Constitutional: He is oriented to person, place, and time. He appears well-developed and well-nourished. No distress.   HENT:   Head: Normocephalic and atraumatic.   Eyes: Pupils are equal, round, and reactive to light. Conjunctivae are normal.   Neck: Normal range of motion. Neck supple. No tracheal deviation present. No thyromegaly present.   Cardiovascular: Normal rate, regular rhythm and normal heart sounds.  Exam reveals no gallop and no friction rub.    No murmur heard.  Pulmonary/Chest: Effort normal and breath sounds normal. No respiratory distress. He has no wheezes.   Abdominal: Soft. Bowel sounds are normal. He exhibits no distension and no mass. There is no tenderness. There is no rebound and no guarding.   Musculoskeletal: Normal range of motion. He exhibits deformity. He exhibits no edema.   Bilateral BKA   Lymphadenopathy:     He has no cervical adenopathy.   Neurological: He is alert and oriented to person, place, and time. No cranial nerve deficit.   Skin: Skin is warm and dry. He is not diaphoretic.   Psychiatric: He has a normal mood and affect.   Nursing note and vitals reviewed.      Laboratory:      Recent Labs      08/27/18 2003   SODIUM  134*   POTASSIUM  4.9   CHLORIDE  101   CO2  23   GLUCOSE  329*   BUN  19   CREATININE  1.25   CALCIUM  7.6*     Recent Labs      08/27/18 2003   GLUCOSE  329*         Recent Labs      08/27/18 2003   BNPBTYPENAT  607*         Lab Results   Component Value Date    TROPONINI 0.16 (H) 08/27/2018       Urinalysis:    No results found     Imaging:  No orders to display         Assessment/Plan:  I anticipate this patient is appropriate for observation status at this time.    * Chest pain   Assessment & Plan    Atypical but in the setting of known CAD and DM.  Troponin is indeterminately elevated, but patient does also have  renal insufficiency.  Will trend troponin, Possible stress testing         NSTEMI (non-ST elevated myocardial infarction) ([facility])   Assessment & Plan    Concern for the same.  Monitor troponin.          Hx of BKA ([facility])   Assessment & Plan    Bilateral in the setting of prior Diabetic foot infections.  Likely vascular disease as well.          CKD (chronic kidney disease)   Assessment & Plan    Unclear if acute component.  Avoid nephrotoxins, monitor.         Essential hypertension   Assessment & Plan    Controlled with current regimen.         Type 2 diabetes mellitus with hyperglycemia ([facility])   Assessment & Plan    With current hyperglycemia.  Basal and Prandial insulin dosing.         CAD (coronary artery disease)   Assessment & Plan    Status post prior CABG.  Unaware of which medications she is currently taking.             VTE prophylaxis: SCD, heparin

## 2018-08-29 ENCOUNTER — PATIENT OUTREACH (OUTPATIENT)
Dept: HEALTH INFORMATION MANAGEMENT | Facility: OTHER | Age: 43
End: 2018-08-29

## 2018-08-29 VITALS
OXYGEN SATURATION: 92 % | SYSTOLIC BLOOD PRESSURE: 125 MMHG | WEIGHT: 122.36 LBS | DIASTOLIC BLOOD PRESSURE: 73 MMHG | HEIGHT: 61 IN | TEMPERATURE: 98.1 F | HEART RATE: 81 BPM | RESPIRATION RATE: 18 BRPM | BODY MASS INDEX: 23.1 KG/M2

## 2018-08-29 PROBLEM — R07.89 ATYPICAL CHEST PAIN: Status: ACTIVE | Noted: 2018-08-27

## 2018-08-29 PROBLEM — R79.89 ELEVATED TROPONIN: Status: ACTIVE | Noted: 2018-08-29

## 2018-08-29 LAB
EKG IMPRESSION: NORMAL
GLUCOSE BLD-MCNC: 127 MG/DL (ref 65–99)

## 2018-08-29 PROCEDURE — 700102 HCHG RX REV CODE 250 W/ 637 OVERRIDE(OP): Performed by: HOSPITALIST

## 2018-08-29 PROCEDURE — 700102 HCHG RX REV CODE 250 W/ 637 OVERRIDE(OP): Performed by: INTERNAL MEDICINE

## 2018-08-29 PROCEDURE — 99239 HOSP IP/OBS DSCHRG MGMT >30: CPT | Performed by: INTERNAL MEDICINE

## 2018-08-29 PROCEDURE — 700111 HCHG RX REV CODE 636 W/ 250 OVERRIDE (IP): Performed by: HOSPITALIST

## 2018-08-29 PROCEDURE — 82962 GLUCOSE BLOOD TEST: CPT

## 2018-08-29 PROCEDURE — A9270 NON-COVERED ITEM OR SERVICE: HCPCS | Performed by: HOSPITALIST

## 2018-08-29 PROCEDURE — 700105 HCHG RX REV CODE 258: Performed by: HOSPITALIST

## 2018-08-29 PROCEDURE — A9270 NON-COVERED ITEM OR SERVICE: HCPCS | Performed by: INTERNAL MEDICINE

## 2018-08-29 RX ORDER — CARVEDILOL 12.5 MG/1
12.5 TABLET ORAL 2 TIMES DAILY WITH MEALS
Qty: 60 TAB | Refills: 1 | Status: SHIPPED | OUTPATIENT
Start: 2018-08-29

## 2018-08-29 RX ORDER — SPIRONOLACTONE 25 MG/1
25 TABLET ORAL DAILY
Qty: 30 TAB | Refills: 3 | Status: SHIPPED | OUTPATIENT
Start: 2018-08-30

## 2018-08-29 RX ORDER — LISINOPRIL 5 MG/1
5 TABLET ORAL DAILY
Qty: 30 TAB | Refills: 1 | Status: SHIPPED | OUTPATIENT
Start: 2018-08-30

## 2018-08-29 RX ADMIN — LISINOPRIL 5 MG: 5 TABLET ORAL at 05:51

## 2018-08-29 RX ADMIN — ASPIRIN 81 MG: 81 TABLET, DELAYED RELEASE ORAL at 05:51

## 2018-08-29 RX ADMIN — ATORVASTATIN CALCIUM 20 MG: 20 TABLET, FILM COATED ORAL at 05:51

## 2018-08-29 RX ADMIN — ENOXAPARIN SODIUM 30 MG: 100 INJECTION SUBCUTANEOUS at 05:52

## 2018-08-29 RX ADMIN — FUROSEMIDE 20 MG: 20 TABLET ORAL at 05:51

## 2018-08-29 RX ADMIN — CLOPIDOGREL 75 MG: 75 TABLET, FILM COATED ORAL at 05:51

## 2018-08-29 RX ADMIN — SODIUM CHLORIDE: 9 INJECTION, SOLUTION INTRAVENOUS at 05:57

## 2018-08-29 RX ADMIN — GABAPENTIN 600 MG: 300 CAPSULE ORAL at 05:51

## 2018-08-29 RX ADMIN — HYDROCODONE BITARTRATE AND ACETAMINOPHEN 1 TABLET: 5; 325 TABLET ORAL at 09:51

## 2018-08-29 RX ADMIN — CARVEDILOL 12.5 MG: 12.5 TABLET, FILM COATED ORAL at 09:50

## 2018-08-29 RX ADMIN — NICOTINE 21 MG: 21 PATCH, EXTENDED RELEASE TRANSDERMAL at 05:51

## 2018-08-29 RX ADMIN — FLUOXETINE HYDROCHLORIDE 20 MG: 20 CAPSULE ORAL at 05:51

## 2018-08-29 RX ADMIN — SPIRONOLACTONE 25 MG: 25 TABLET ORAL at 05:51

## 2018-08-29 ASSESSMENT — ENCOUNTER SYMPTOMS
NAUSEA: 0
HEADACHES: 0
ORTHOPNEA: 0
FEVER: 0
SHORTNESS OF BREATH: 0
HEMOPTYSIS: 0
VOMITING: 0
DIZZINESS: 0
PALPITATIONS: 0
CLAUDICATION: 0
COUGH: 0
CHILLS: 0
PND: 0
SPUTUM PRODUCTION: 0
WHEEZING: 0

## 2018-08-29 ASSESSMENT — PAIN SCALES - GENERAL
PAINLEVEL_OUTOF10: 2
PAINLEVEL_OUTOF10: 3
PAINLEVEL_OUTOF10: 7

## 2018-08-29 NOTE — DISCHARGE INSTRUCTIONS
Discharge Instructions    Discharged to home by car with friend. Discharged via wheelchair, hospital escort: Yes.  Special equipment needed: Not Applicable    Be sure to schedule a follow-up appointment with your primary care doctor or any specialists as instructed.     Discharge Plan:   Diet Plan: Discussed  Activity Level: Discussed  Smoking Cessation Offered: Patient Refused  Confirmed Follow up Appointment: Appointment Scheduled  Confirmed Symptoms Management: Discussed  Medication Reconciliation Updated: Yes  Influenza Vaccine Indication: Patient Refuses    I understand that a diet low in cholesterol, fat, and sodium is recommended for good health. Unless I have been given specific instructions below for another diet, I accept this instruction as my diet prescription.   Other diet: diabetic    Special Instructions: Diagnosis:  Acute Coronary Syndrome (ACS) is a diagnosis that encompasses cardiac-related chest pain and heart attack. ACS occurs when the blood flow to the heart muscle is severely reduced or cut off completely due to a slow process called atherosclerosis.  Atherosclerosis is a disease in which the coronary arteries become narrow from a buildup of fat, cholesterol, and other substances that combine to form plaque. If the plaque breaks, a blood clot will form and block the blood flow to the heart muscle. This lack of blood flow can cause damage or death to the heart muscle which is called a heart attack or Myocardial Infarction (MI). There are two different types of MIs:  ST Elevation Myocardial Infarction or STEMI (the most severe type of heart attack) and Non-ST Elevation Myocardial Infarction or NSTEMI.    Treatment Plan:  · Cardiac Diet  - Low fat, low salt, low cholesterol   · Cardiac Rehab  - Your doctor has ordered you a referral to [facility].  Call [phone] to schedule an appointment.  · Attend my follow-up appointment with my Cardiologist.  · Take my medications as prescribed by my  doctor  · Exercise daily  · Quit Smoking, lower my blood pressure, Reduce stress and Control my diabetes    Medications:  Certain medications are used to treat ACS.  Remember to always take medications as prescribed and never stop talking medications unless told by your doctor.    You have been prescribed the following medicatons:    Aspirin - Aspirin is used as a blood thinning medication and you will require this medication indefinitely.  Anti-platelet/blood thinner - Your Anti-platelet/Blood thinning medication is called Clopidogrel/plavix, and is used in combination with aspirin to prevent clots from forming in your heart and/or around your stent.  Your doctor will determine how long you need to be on this medicine.  Beta-Blocker - Beta-Blocker Carbedilol/coreg is used to lower blood pressure and heart rate, and/or helps your heart heal after a heart attack.  Statin - Statin Atorvastatin/lipitor is used to lower cholesterol.  Angiotensin Converting Enzyme (ACE) Inhibitor - Angiotensin Converting Enzyme Inhibitor Lisinopril/prinivil is used to lower blood pressure and treat heart failure.    · Is patient discharged on Warfarin / Coumadin?   No     Steps to Quit Smoking  Smoking tobacco can be bad for your health. It can also affect almost every organ in your body. Smoking puts you and people around you at risk for many serious long-lasting (chronic) diseases. Quitting smoking is hard, but it is one of the best things that you can do for your health. It is never too late to quit.  What are the benefits of quitting smoking?  When you quit smoking, you lower your risk for getting serious diseases and conditions. They can include:  · Lung cancer or lung disease.  · Heart disease.  · Stroke.  · Heart attack.  · Not being able to have children (infertility).  · Weak bones (osteoporosis) and broken bones (fractures).  If you have coughing, wheezing, and shortness of breath, those symptoms may get better when you quit.  You may also get sick less often. If you are pregnant, quitting smoking can help to lower your chances of having a baby of low birth weight.  What can I do to help me quit smoking?  Talk with your doctor about what can help you quit smoking. Some things you can do (strategies) include:  · Quitting smoking totally, instead of slowly cutting back how much you smoke over a period of time.  · Going to in-person counseling. You are more likely to quit if you go to many counseling sessions.  · Using resources and support systems, such as:  ¨ Online chats with a counselor.  ¨ Phone quitlines.  ¨ Printed self-help materials.  ¨ Support groups or group counseling.  ¨ Text messaging programs.  ¨ Mobile phone apps or applications.  · Taking medicines. Some of these medicines may have nicotine in them. If you are pregnant or breastfeeding, do not take any medicines to quit smoking unless your doctor says it is okay. Talk with your doctor about counseling or other things that can help you.  Talk with your doctor about using more than one strategy at the same time, such as taking medicines while you are also going to in-person counseling. This can help make quitting easier.  What things can I do to make it easier to quit?  Quitting smoking might feel very hard at first, but there is a lot that you can do to make it easier. Take these steps:  · Talk to your family and friends. Ask them to support and encourage you.  · Call phone quitlines, reach out to support groups, or work with a counselor.  · Ask people who smoke to not smoke around you.  · Avoid places that make you want (trigger) to smoke, such as:  ¨ Bars.  ¨ Parties.  ¨ Smoke-break areas at work.  · Spend time with people who do not smoke.  · Lower the stress in your life. Stress can make you want to smoke. Try these things to help your stress:  ¨ Getting regular exercise.  ¨ Deep-breathing exercises.  ¨ Yoga.  ¨ Meditating.  ¨ Doing a body scan. To do this, close your  eyes, focus on one area of your body at a time from head to toe, and notice which parts of your body are tense. Try to relax the muscles in those areas.  · Download or buy apps on your mobile phone or tablet that can help you stick to your quit plan. There are many free apps, such as QuitGuide from the CDC (Centers for Disease Control and Prevention). You can find more support from smokefree.gov and other websites.  This information is not intended to replace advice given to you by your health care provider. Make sure you discuss any questions you have with your health care provider.  Document Released: 10/14/2010 Document Revised: 08/15/2017 Document Reviewed: 05/03/2016  [employer] Interactive Patient Education © 2017 [employer]  Smoking Cessation, Tips for Success  If you are ready to quit smoking, congratulations! You have chosen to help yourself be healthier. Cigarettes bring nicotine, tar, carbon monoxide, and other irritants into your body. Your lungs, heart, and blood vessels will be able to work better without these poisons. There are many different ways to quit smoking. Nicotine gum, nicotine patches, a nicotine inhaler, or nicotine nasal spray can help with physical craving. Hypnosis, support groups, and medicines help break the habit of smoking.  WHAT THINGS CAN I DO TO MAKE QUITTING EASIER?   Here are some tips to help you quit for good:  · Pick a date when you will quit smoking completely. Tell all of your friends and family about your plan to quit on that date.  · Do not try to slowly cut down on the number of cigarettes you are smoking. Pick a quit date and quit smoking completely starting on that day.  · Throw away all cigarettes.    · Clean and remove all ashtrays from your home, work, and car.  · On a card, write down your reasons for quitting. Carry the card with you and read it when you get the urge to smoke.  · Cleanse your body of nicotine. Drink enough water and fluids to keep your urine  "clear or pale yellow. Do this after quitting to flush the nicotine from your body.  · Learn to predict your moods. Do not let a bad situation be your excuse to have a cigarette. Some situations in your life might tempt you into wanting a cigarette.  · Never have \"just one\" cigarette. It leads to wanting another and another. Remind yourself of your decision to quit.  · Change habits associated with smoking. If you smoked while driving or when feeling stressed, try other activities to replace smoking. Stand up when drinking your coffee. Brush your teeth after eating. Sit in a different chair when you read the paper. Avoid alcohol while trying to quit, and try to drink fewer caffeinated beverages. Alcohol and caffeine may urge you to smoke.  · Avoid foods and drinks that can trigger a desire to smoke, such as sugary or spicy foods and alcohol.  · Ask people who smoke not to smoke around you.  · Have something planned to do right after eating or having a cup of coffee. For example, plan to take a walk or exercise.  · Try a relaxation exercise to calm you down and decrease your stress. Remember, you may be tense and nervous for the first 2 weeks after you quit, but this will pass.  · Find new activities to keep your hands busy. Play with a pen, coin, or rubber band. Doodle or draw things on paper.  · Brush your teeth right after eating. This will help cut down on the craving for the taste of tobacco after meals. You can also try mouthwash.    · Use oral substitutes in place of cigarettes. Try using lemon drops, carrots, cinnamon sticks, or chewing gum. Keep them handy so they are available when you have the urge to smoke.  · When you have the urge to smoke, try deep breathing.  · Designate your home as a nonsmoking area.  · If you are a heavy smoker, ask your health care provider about a prescription for nicotine chewing gum. It can ease your withdrawal from nicotine.  · Reward yourself. Set aside the cigarette money you " "save and buy yourself something nice.  · Look for support from others. Join a support group or smoking cessation program. Ask someone at home or at work to help you with your plan to quit smoking.  · Always ask yourself, \"Do I need this cigarette or is this just a reflex?\" Tell yourself, \"Today, I choose not to smoke,\" or \"I do not want to smoke.\" You are reminding yourself of your decision to quit.  · Do not replace cigarette smoking with electronic cigarettes (commonly called e-cigarettes). The safety of e-cigarettes is unknown, and some may contain harmful chemicals.  · If you relapse, do not give up! Plan ahead and think about what you will do the next time you get the urge to smoke.  HOW WILL I FEEL WHEN I QUIT SMOKING?  You may have symptoms of withdrawal because your body is used to nicotine (the addictive substance in cigarettes). You may crave cigarettes, be irritable, feel very hungry, cough often, get headaches, or have difficulty concentrating. The withdrawal symptoms are only temporary. They are strongest when you first quit but will go away within 10-14 days. When withdrawal symptoms occur, stay in control. Think about your reasons for quitting. Remind yourself that these are signs that your body is healing and getting used to being without cigarettes. Remember that withdrawal symptoms are easier to treat than the major diseases that smoking can cause.   Even after the withdrawal is over, expect periodic urges to smoke. However, these cravings are generally short lived and will go away whether you smoke or not. Do not smoke!  WHAT RESOURCES ARE AVAILABLE TO HELP ME QUIT SMOKING?  Your health care provider can direct you to community resources or hospitals for support, which may include:  · Group support.  · Education.  · Hypnosis.  · Therapy.     This information is not intended to replace advice given to you by your health care provider. Make sure you discuss any questions you have with your health " care provider.     Document Released: 09/15/2005 Document Revised: 01/08/2016 Document Reviewed: 06/05/2014  [employer] Interactive Patient Education ©2016 [employer].  Diabetes, Type 2, Am I At Risk?  Diabetes is a lasting (chronic) disease. In type 2 diabetes, the pancreas does not make enough insulin, and the body does not respond normally to the insulin that is made. This type of diabetes was also previously called adult onset diabetes. About 90% of all those who have diabetes have type 2. It usually occurs after the age of 40, but can occur at any age.   People develop type 2 diabetes because they do not use insulin properly. Eventually, the pancreas cannot make enough insulin for the body's needs. Over time, the amount of glucose (sugar) in the blood increases.  RISK FACTORS  Overweight  the more weight you have, the more resistant your cells become to insulin.  Family history  you are more likely to get diabetes if a parent or sibling has diabetes.  Race certain races get diabetes more.   Americans.  American Indians.   Americans.  Hispanics.  .  Inactive exercise helps control weight and helps your cells be more sensitive to insulin.  Gestational diabetes  some women develop diabetes while they are pregnant. This goes away when they deliver. However, they are 50-60% more likely to develop type 2 diabetes at a later time.  Having a baby over 9 pounds  a sign that you may have had gestational diabetes.  Age the risk of diabetes goes up as you get older, especially after age 45.  High blood pressure (hypertension).  SYMPTOMS  Many people have no signs or symptoms. Symptoms can be so mild that you might not even notice them. Some of these signs are:  Increased thirst.  Increased hunger.  Tiredness (fatigue).  Increased urination, especially at night.  Weight loss.  Blurred vision.  Sores that do not heal.  WHO SHOULD BE TESTED?  Anyone 45 years or older, especially if overweight,  should consider getting tested.  If you are younger than 45, overweight, and have one or more of the risk factors, you should consider getting tested.  DIAGNOSIS  Fasting blood glucose (FBS). Usually, 2 are done.  -125 mg/dl is considered pre-diabetes.   mg/dl or greater is considered diabetes.  2 hour Oral Glucose Tolerance Test (OGTT). This test is preformed by first having you not eat or drink for several hours. You are then given something sweet to drink and your blood glucose is measured fasting, at one hour and 2 hours. This test tells how well you are able to handle sugars or carbohydrates.  Fastin-100 mg/dl.  1 hour: less than 200 mg/dl.  2 hours: less than 140 mg/dl.  A1c A1c is a blood glucose test that gives and average of your blood glucose over 3 months. It is the accepted method to use to diagnose diabetes.  A1c 5.7-6.4% is considered pre-diabetes.  A1c 6.5% or greater is considered diabetes.  WHAT DOES IT MEAN TO HAVE PRE-DIABETES?  Pre-diabetes means you are at risk for getting type 2 diabetes. Your blood glucose is higher than normal, but not yet high enough to diagnose diabetes. The good news is, if you have pre-diabetes you can reduce the risk of getting diabetes and even return to normal blood glucose levels. With modest weight loss and moderate physical activity, you can delay or prevent type 2 diabetes.   PREVENTION  You cannot do anything about race, age or family history, but you can lower your chances of getting diabetes. You can:   Exercise regularly and be active.  Reduce fat and calorie intake.  Make wise food choices as much as you can.  Reduce your intake of salt and alcohol.  Maintain a reasonable weight.  Keep blood pressure in an acceptable range. Take medication if needed.  Not smoke.  Maintain an acceptable cholesterol level (HDL, LDL, Triglycerides). Take medication if needed.  DOING MY PART: GETTING STARTED  Making big changes in your life is hard, especially  if you are faced with more than one change. You can make it easier by taking these steps:  Make a plan to change behavior.  Decide exactly what you will do and when you will do it.  Plan what you need to get ready.  Think about what might prevent you from reaching your goals.  Find family and friends who will support and encourage you.  Decide how you will reward yourself when you do what you have planned.  Your doctor, dietitian, or counselor can help you make a plan.  HERE ARE SOME OF THE AREAS YOU MAY WISH TO CHANGE TO REDUCE YOUR RISK OF DIABETES.  If you are overweight or obese, choose sensible ways to get in shape. Even small amounts of weight loss, like 5-10 pounds, can help reduce the effects of insulin resistance and help blood glucose control.  Diet  Avoid crash diets. Instead, eat less of the foods you usually have. Limit the amount of fat you eat.  Increase your physical activity. Aim for at least 30 minutes of exercise most days of the week.  Set a reasonable weight-loss goal, such as losing 1 pound a week. Aim for a long-term goal of losing 5-7% of your total body weight.  Make wise food choices most of the time.  What you eat has a big impact on your health. By making carreon food choices, you can help control your body weight, blood pressure, and cholesterol.  Take a hard look at the serving sizes of the foods you eat. Reduce serving sizes of meat, desserts, and foods high in fat. Increase your intake of fruits and vegetables.  Limit your fat intake to about 25% of your total calories. For example, if your food choices add up to about 2,000 calories a day, try to eat no more than 56 grams of fat. Your caregiver or a dietitian can help you figure out how much fat to have. You can check food labels for fat content too.  You may also want to reduce the number of calories you have each day.  Keep a food log. Write down what you eat, how much you eat, and anything else that helps keep you on track.  When you  meet your goal, reward yourself with a nonfood item or activity.  Exercise  Be physically active every day.  Keep and exercise log. Write down what exercise you did, for how long, and anything else that keeps you on track.  Regular exercise (like brisk walking) tackles several risk factors at once. It helps you lose weight, it keeps your cholesterol and blood pressure under control, and it helps your body use insulin. People who are physically active for 30 minutes a day, 5 days a week, reduced their risk of type 2 diabetes. If you are not very active, you should start slowly at first. Talk with your caregiver first about what kinds of exercise would be safe for you. Make a plan to increase your activity level with the goal of being active for at least 30 minutes a day, most days of the week.  Choose activities you enjoy. Here are some ways to work extra activity into your daily routine:  Take the stairs rather than an elevator or escalator.  Park at the far end of the lot and walk.  Get off the bus a few stops early and walk the rest of the way.  Walk or bicycle instead of drive whenever you can.  Medications  Some people need medication to help control their blood pressure or cholesterol levels. If you do, take your medicines as directed. Ask your caregiver whether there are any medicines you can take to prevent type 2 diabetes.  Document Released: 12/20/2004 Document Revised: 03/11/2013 Document Reviewed: 09/15/2010  ExitCare® Patient Information ©2014 [employer].  Diabetes, Keeping Your Heart and Blood Vessels Healthy  Too much glucose (sugar) in the blood for a long period of time can cause problems for those who have diabetes. This high blood glucose can damage many parts of the body, such as the heart, blood vessels, eyes, and kidneys. Heart and blood vessel disease can lead to heart attacks and strokes, which is the leading cause of death for people with diabetes. There are many things you can to do slow  down or prevent the complications from diabetes.  WHAT DO MY HEART AND BLOOD VESSELS DO?  Your heart and blood vessels make up your circulatory system. Your heart is a big muscle that pumps blood through your body. Your heart pumps blood carrying oxygen to large blood vessels (arteries) and small blood vessels (capillaries). Other blood vessels, called veins, carry blood back to the heart.  HOW DO MY BLOOD VESSELS GET CLOGGED?  Several things, including having diabetes, can make your blood cholesterol level too high. Cholesterol is a substance that is made by the body and used for many important functions. It is also found in some food that comes from animals. When cholesterol is too high, the insides of large blood vessels become narrowed, even clogged. Narrowed and clogged blood vessels make it harder for enough blood to get to all parts of your body. This problem is called atherosclerosis.  WHAT CAN HAPPEN WHEN BLOOD VESSELS ARE CLOGGED?  When arteries become narrowed and clogged, you may have heart problems and are at increased risk for heart attack and stroke:  · Chest pain (angina) causes pain in your chest, arms, shoulders, or back. You may feel the pain more when your heart beats faster, such as when you exercise. The pain may go away when you rest. You also may feel very weak and sweaty. If you do not get treatment, chest pain may happen more often. If diabetes has damaged the heart nerves, you may not feel the chest pain.   · Heart attack. A heart attack happens when a blood vessel in or near the heart becomes blocked. Not enough blood can get to that part of the heart muscle so the area becomes oxygen deprived and the heart muscle may be permanently damaged.   WHAT ARE THE WARNING SIGNS OF A HEART ATTACK?  You may have one or more of the following warning signs:  · Chest pain or discomfort.   · Pain or discomfort in your arms, back, jaw, or neck.   · Indigestion or stomach pain.   · Shortness of breath.  "  · Sweating.   · Nausea or vomiting.   · Light-headedness.   · You may have no warning signs at all, or they may come and go.   HOW DOES HEART DISEASE CAUSE HIGH BLOOD PRESSURE?  Narrowed blood vessels leave a smaller opening for blood to flow through. It is like turning on a garden hose and holding your thumb over the opening. The smaller opening makes the water shoot out with more pressure. In the same way, narrowed blood vessels lead to high blood pressure. Other factors, such as kidney problems and being overweight, can also lead to high blood pressure.  Many people with diabetes also have high blood pressure. If you have heart, eye, or kidney problems from diabetes, high blood pressure can make them worse.  If you have high blood pressure, ask your caregiver how to lower it. Your caregiver may be asked to take blood pressure medicine every day. Some types of blood pressure medicine can also help keep your kidneys healthy.  To lower your blood pressure, your may be asked to lose weight; eat more fruits and vegetables; eat less salt and high-sodium foods, such as canned soups, luncheon meats, salty snack foods, and fast foods; and drink less alcohol.  WHAT ARE THE WARNING SIGNS OF A STROKE?  A stroke happens when part of your brain is not getting enough blood and stops working. Depending on the part of the brain that is damaged, a stroke can cause:  · Sudden weakness or numbness of your face, arm, or leg on one side of your body.   · Sudden confusion, trouble talking, or trouble understanding.   · Sudden dizziness, loss of balance, or trouble walking.   · Sudden trouble seeing in one or both eyes or sudden double vision.   · Sudden severe headache.   Sometimes, one or more of these warning signs may happen and then disappear. You might be having a \"mini-stroke,\" also called a TIA (transient ischemic attack). If you have any of these warning signs, tell your caregiver right away.  HOW CAN CLOGGED BLOOD VESSELS " "HURT MY LEGS AND FEET?  Peripheral vascular disease can happen when the openings in your blood vessels become narrow and not enough blood gets to your legs and feet. You may feel pain in your buttocks, the back of your legs, or your thighs when you stand, walk, or exercise. Sometimes, surgery is necessary to treat this problem.  WHAT CAN I DO TO KEEP MY BLOOD VESSELS HEALTHY AND PREVENT HEART DISEASE AND STROKE?  · Keep your blood glucose under control. An A1c blood test will probably be ordered by your caregiver at least twice a year. The A1c test tells you your average blood glucose for the past 2 to 3 months and can give valuable information on the overall control of your diabetes.   · Keep your blood pressure under control. Have it checked at every health care visit. If you are on medication to control blood pressure, take it exactly as prescribed. The target for most people is below 130/80.   · Keep your cholesterol under control. Have it checked at least once a year. The targets for most people are:   · LDL (bad) cholesterol: below 100 mg/dl.   · HDL (good) cholesterol: above 40 mg/dl in men and above 50 mg/dl in women.   · Triglycerides (another type of fat in the blood): below 150 mg/dl.   · Make physical activity a part of your daily routine. Aim for at least 30 minutes of exercise most days of the week. Check with your caregiver to learn what activities are best for you.   · Make sure that the foods you eat are \"heart-healthy.\" Include foods high in fiber, such as oat bran, oatmeal, whole-grain breads and cereals, fruits, and vegetables. Cut back on foods high in saturated fat or cholesterol, such as meats, butter, dairy products with fat, eggs, shortening, lard, and foods with palm oil or coconut oil.   · Maintain a healthy weight. If you are overweight, try to exercise most days of the week. See a registered dietitian for help in planning meals and lowering the fat and calorie content.   · If you smoke, " "QUIT. Your caregiver can tell you about ways to help you quit smoking.   · Ask your caregiver whether you should take an aspirin every day. Studies have shown that taking a low dose of aspirin every day can help reduce your risk of heart disease and stroke.   · Take your medicines as directed.   SEEK MEDICAL CARE IF:   · You have any of the warning signs of a heart attack or stroke.   · If you have pain in your legs or feet when walking.   · If your feet and legs are cool or cold to the touch.   FOR MORE INFORMATION   · Diabetes educators (nurses, dietitians, pharmacists, and other health professionals).   · To find a diabetes educator near you, call the American Association of Diabetes Educators (AADE) toll-free at 2-338-JTKEQK8 (1-704.221.4562), or look on the Internet at www.diabeteseducator.org and click on \"Find a Diabetes Educator.\"   · Dietitians.   · To find a dietitian near you, call the American Dietetic Association toll-free at 1-755.623.1377, or look on the Internet at www.eatRebleht.org and click on \"Find a Nutrition Professional.\"   · Government.   · The National Heart, Lung, and Blood Scott (NHLBI) is part of the National Institutes of Health. To learn more about heart and blood vessel problems, write or call NHLBI Information Center, [address] [address], [place] [address], (812) 940-3528; or see www.nhlbi.nih.gov on the Internet.   · To get more information about taking care of diabetes, contact:   National Diabetes Information Clearinghouse  [address] [place] [address]  Phone: 1-135.864.3685 or (912) 820-1696  Fax: [phone]  Email: ndic@info.niddk.nih.gov  Internet: www.diabetes.niddk.nih.gov  National Diabetes Education Program  [address] [place] [address]  Phone: 1-201.911.4616  Fax: [phone]  Internet: http://ndep.nih.gov  American Diabetes Association  [address] [place] [address]  Phone: 1-910.991.3774  Internet: " www.diabetes.org  Juvenile Diabetes Research Foundation Int'l  [address] [place] [address]  Phone: 1-664.281.1574  Internet: www.jdrf.org  Document Released: 12/20/2004 Document Revised: 03/11/2013 Document Reviewed: 05/28/2010  ExitCare® Patient Information ©2013 [email or web address].    Depression / Suicide Risk    As you are discharged from this [facility] facility, it is important to learn how to keep safe from harming yourself.    Recognize the warning signs:  · Abrupt changes in personality, positive or negative- including increase in energy   · Giving away possessions  · Change in eating patterns- significant weight changes-  positive or negative  · Change in sleeping patterns- unable to sleep or sleeping all the time   · Unwillingness or inability to communicate  · Depression  · Unusual sadness, discouragement and loneliness  · Talk of wanting to die  · Neglect of personal appearance   · Rebelliousness- reckless behavior  · Withdrawal from people/activities they love  · Confusion- inability to concentrate     If you or a loved one observes any of these behaviors or has concerns about self-harm, here's what you can do:  · Talk about it- your feelings and reasons for harming yourself  · Remove any means that you might use to hurt yourself (examples: pills, rope, extension cords, firearm)  · Get professional help from the community (Mental Health, Substance Abuse, psychological counseling)  · Do not be alone:Call your Safe Contact- someone whom you trust who will be there for you.  · Call your local CRISIS HOTLINE 990-5528 or 838-015-3484  · Call your local Children's Mobile Crisis Response Team [place] (340) 143-9386 or www.[employer]  · Call the toll free National Suicide Prevention Hotlines   · National Suicide Prevention Lifeline 234-474-XEDF (5316)  · National Hope Line Network 800-SUICIDE (520-8089)

## 2018-08-29 NOTE — PROGRESS NOTES
Received report from Lola at change of shift. Pt complaining of some pain all over. No sob. Call light within reach.

## 2018-08-29 NOTE — PROGRESS NOTES
Patient discharged home with friend. Discharge teaching attempted, patient refused discharge teaching but did sign the forms. All personal belongings with patient. Patient aaox4. No signs of distress. All lines removed, tele box removed and placed in monitor room. Patient asked RN multiple times to call security for cigarettes, RN called within seconds of calling patient refused to wait, RN Julius wheeled patient down.

## 2018-08-29 NOTE — HEART FAILURE PROGRAM
"Cardiovascular Nurse Navigator () Advanced Heart Failure Program Inpatient Progress Note:    Per Dr. MARV Browning on 8/29 this patient is not currently in acute heart failure. Therefore, a seven day HF f/u appt is not currently indicated.    Patient has been treated for non cardiac chest pain, hypertension, and hyperglycemia. MPI negative for ischemia.    Patient is on appropriate GDMT: carvedilol, lisinopril, and spironolactone.    Advanced Care Planning:  No AD on file. Full code status at the time of this note's filing.    The ACC recommends engaging palliative care as part of optimization of HFrEF treatment to solicit goals of care and focus on quality of life throughout the clinical course of HF.    Once all diagnostics are in, please consider an order for palliative to discuss Advanced Care Planning.      Speaking with patients frankly about their end-of-life wishes is one of the most important things a palliative care team can do. This is especially important in the context of heart failure, since it’s such an unpredictable disease.    Should clinical status change to acute HF, patient will require a seven calendar day f/u appt which can be achieved by placing a \"schedule heart failure follow up appointment\" order per protocol or by calling the hospital schedulers at [phone].     Thank you and please call with questions or concerns.    "

## 2018-08-29 NOTE — CONSULTS
DATE OF SERVICE:  08/28/2018    CARDIOLOGY CONSULTATION    DOCTOR ASKING FOR THE CONSULTATION:  Edu Young M.D.    INDICATION:  Abnormal stress test.    HISTORY OF PRESENT ILLNESS:  The patient is an unfortunate 42-year-old woman   who is a very poor historian.  She is surrounded by her brother, her mother   and her father, all of whom live in [place].  She herself has lived in [place] independently despite her multiple illnesses, lack of legs and   noncompliance.  They have just relocated her to [place].    She was transferred here to the emergency room at 8:00 last night because of   chest pain.  She had a troponin that was 0.1.  She described it as crushing,   came and went, every 15-20 minutes, associated with nausea and diaphoresis.    She had bypass surgery done several years ago in [place] because of coronary   disease.  Her mother tells me several times during the exam that the doctors   were so concerned about her last year in [place], she had an angiogram.  She   ended up having a stroke because of this.  They are very concerned about this.    They were told that she had had a heart attack this admission and that she   would need an angiogram.    She had been hypertensive.  She says she has been taking her medications, but   cannot name any of them outside of aspirin.  All other review of systems is   unremarkable.  She feels well today and hopes she can go home.    PAST MEDICAL HISTORY:  1.  Coronary artery bypass surgery in 2014 x4, vessels unknown, this is done   in the setting of a large heart attack according to her.  2.  Stroke in 2017 during an angiogram.  Little right-sided facial numbness,   residual.  3.  Tobacco use, current.  4.  Heart failure, states she has a low ejection fraction, but does not know   what it is.  5.  Hypertension.  6.  Diabetes.  7.  Renal insufficiency, chronic grade III, GFR 35-45.  8.  Peripheral arterial disease with below-the-knee amputations  bilaterally.    CURRENT MEDICATIONS:  Elavil 100 a day, aspirin 81 a day, Lipitor 20 mg a day,   Plavix 75 mg a day, Lovenox 30 mg a day, Prozac 20 mg a day, Lasix 20 mg   b.i.d., Neurontin 600 t.i.d., Lantus 13 units every night, metoprolol 25   b.i.d., Nicoderm patch, Risperdal 3 mg a daily.    ALLERGIES:  SULFA.    SOCIAL HISTORY:  Smoking, denies alcohol or drug use including   methamphetamine.    FAMILY HISTORY:  Unremarkable for premature coronary disease.  Her family has   all heavy smokers.    PHYSICAL EXAMINATION:  VITAL SIGNS:  Blood pressure currently 151/47, heart rate is in the 80s on   telemetry.  GENERAL:  Unwell-appearing woman who edentulous.  She is sallow, but without   icterus.  No nystagmus or tremor.  Talkative.  NEUROLOGIC:  Cranial nerves are grossly intact on my exam.  No tremor noted.    No weakness of her arms appreciated.  NECK:  Carotid upstrokes are diminished bilaterally.  No carotid bruits are   audible.  HEART:  No audible murmurs or diastolic sounds on my exam.  CHEST:  Clear.  ABDOMEN:  Soft.  MUSCULOSKELETAL:  Her stumps are well approximated without oozing or ulcer.    DATA:  A 12-lead ECG done yesterday and reviewed by me shows sinus mechanism,   left ventricular hypertrophy, nonspecific T-wave inversions, diffuse.  White   count 7.1 with a normal differential, hemoglobin 13.3 with a normal MCV,   platelets 204.  Creatinine is 1.5, glucose 377.  Troponin is 0.09.  LDL is 69.    Stress test today, images are reviewed by me, ejection fraction about 40%.    There is a moderate-sized inferior infarct with no ischemia.    ASSESSMENT AND RECOMMENDATIONS:  A 42-year-old woman who is unfortunate that   she has obliterative coronary and peripheral vascular disease.  In my opinion,   her chest pain is noncardiac.  She has been noncompliant.  Her blood pressure   is high, as is her glucose.  1.  We had a long discussion about compliance and establishing care with a   cardiologist and a  PCP.  She voices understanding.  2.  We talked about smoking cessation.  3.  Her stress test shows that she has no significant ischemia.  She does have   an old infarct and has heart failure.  I will change her medicines slightly,   so they better help with her chronic systolic heart failure, which is stable.    I have no other interventions at this point.    Thank you very much for the consultation.  We will follow her along with you   as warranted.    Thank you kindly.       ____________________________________     MD ARCHIE ELLIOTT / JEANNETTE    DD:  08/28/2018 17:34:17  DT:  08/28/2018 21:12:04    D#:  [id number]  Job#:  [id number]

## 2018-08-29 NOTE — DISCHARGE SUMMARY
Discharge Summary    CHIEF COMPLAINT ON ADMISSION  Chief Complaint   Patient presents with   • Chest Pain     NSTEMI transfer       Reason for Admission  Chest pain    Admission Date  8/27/2018    CODE STATUS  Full Code    HPI & HOSPITAL COURSE  This is a 42 y.o. female with history of CHF, history of stroke CAD s/p CABG, type 2 diabetes mellitus, hypertension, admitted 8/27/2018 with chest pain.  She had an indeterminate troponin at an outside facility.  Initial labs here show troponin of 0.16, sodium of 134, blood glucose of 329, BNP of 607.  EKG showed LVH, with diffuse T-wave changes which were felt to be due to LVH, with no ST elevation or depression, and no acute ischemia.  Further cardiac workup was pursued.  Serial troponins was obtained, and her troponin peaked at 0.17, and trended down. Nuclear stress test was also obtained which showed EF of 40%, with global hypokinesis, no significant reversible ischemia, with moderate size infarct of the inferior lateral wall.  Echocardiogram showed EF of 35%, with no evidence of valvular abnormalities.  Cardiology was consulted, who gave the opinion that pain is noncardiac. Her cardiac medications/regimen were optimized, and she was started on Coreg, lisinopril, and Aldactone, in addition to her Lasix, aspirin, Plavix, and statin.    She remained hemodynamically stable and afebrile.  She did not have any arrhythmia on cardiac monitoring.  She did not have any further recurrence of her chest pain.  With her clinical improvement, she was deemed ready to be discharged from the hospital she did not have any further inpatient needs.  The patient felt comfortable going home.  The discharge plan was discussed with the patient, with which she was agreeable to.    Therefore, she is discharged in fair and stable condition to home with close outpatient follow-up.    The patient met 2-midnight criteria for an inpatient stay at the time of discharge.    Discharge  Date  8/29/2018      FOLLOW UP ITEMS POST DISCHARGE  -She will be continued on her optimize cardiac regimen including Coreg, lisinopril, Aldactone, Lasix, aspirin, Plavix, and lipitor.  Close follow-up with cardiology as outpatient.    DISCHARGE DIAGNOSES  Principal Problem:    Atypical chest pain, cardiac cause ruled out POA: Yes  Active Problems:    Chronic systolic CHF (congestive heart failure) ([facility]) POA: Yes    Elevated troponin, due to demand ischemia POA: Yes    CAD (coronary artery disease) POA: Yes    Type 2 diabetes mellitus with hyperglycemia ([facility]) POA: Yes    Essential hypertension POA: Yes    CKD (chronic kidney disease) stage 3, GFR 30-59 ml/min POA: Yes    Hx of BKA ([facility]) POA: Yes  Resolved Problems:    * No resolved hospital problems. *      FOLLOW UP    [address] [phone]    Hospital follow-up      MEDICATIONS ON DISCHARGE     Medication List      START taking these medications      Instructions   carvedilol 12.5 MG Tabs  Commonly known as:  COREG   Take 1 Tab by mouth 2 times a day, with meals.  Dose:  12.5 mg     lisinopril 5 MG Tabs  Commonly known as:  PRINIVIL   Take 1 Tab by mouth every day.  Dose:  5 mg     spironolactone 25 MG Tabs  Commonly known as:  ALDACTONE   Take 1 Tab by mouth every day.  Dose:  25 mg        CONTINUE taking these medications      Instructions   aspirin EC 81 MG Tbec  Commonly known as:  ECOTRIN   Take 81 mg by mouth every morning.  Dose:  81 mg     atorvastatin 20 MG Tabs  Commonly known as:  LIPITOR   Take 20 mg by mouth every morning.  Dose:  20 mg     clopidogrel 75 MG Tabs  Commonly known as:  PLAVIX   Take 75 mg by mouth every morning.  Dose:  75 mg     FLUoxetine 20 MG Caps  Commonly known as:  PROZAC   Take 20 mg by mouth every morning.  Dose:  20 mg     furosemide 20 MG Tabs  Commonly known as:  LASIX   Take 20 mg by mouth 2 times a day.  Dose:  20 mg     gabapentin 600 MG tablet  Commonly known  as:  NEURONTIN   Take 600 mg by mouth 3 times a day.  Dose:  600 mg     HYDROcodone-acetaminophen 5-325 MG Tabs per tablet  Commonly known as:  NORCO   Take 1 Tab by mouth 3 times a day as needed.  Dose:  1 Tab     insulin glargine 100 UNIT/ML Soln  Commonly known as:  LANTUS   Inject 13 Units as instructed every bedtime.  Dose:  13 Units     insulin lispro 100 UNIT/ML Soln  Commonly known as:  HUMALOG   Inject 1-8 Units as instructed 4 Times a Day,Before Meals and at Bedtime.  Dose:  1-8 Units     * risperiDONE 2 MG Tabs  Commonly known as:  RISPERDAL   Take 4-5 mg by mouth See Admin Instructions. Start date: 8/26/18 4 mg once daily for 7 days then 5 mg thereafter  Dose:  4-5 mg     * risperiDONE 1 MG Tabs  Commonly known as:  RISPERDAL   Take 2-3 mg by mouth See Admin Instructions. Start date: 8/26/18 2 mg once daily for 7 days then 3 mg thereafter  Dose:  2-3 mg        * This list has 2 medication(s) that are the same as other medications prescribed for you. Read the directions carefully, and ask your doctor or other care provider to review them with you.            STOP taking these medications    metoprolol 25 MG Tabs  Commonly known as:  LOPRESSOR            Allergies  Allergies   Allergen Reactions   • Fish Allergy    • Sulfa Drugs Hives       DIET  Orders Placed This Encounter   Procedures   • Diet Order Diabetic     Standing Status:   Standing     Number of Occurrences:   1     Order Specific Question:   Diet:     Answer:   Diabetic [3]       ACTIVITY  As tolerated.  Weight bearing as tolerated    CONSULTATIONS  Cardiology    PROCEDURES  None    LABORATORY  Lab Results   Component Value Date    SODIUM 132 (L) 08/28/2018    POTASSIUM 4.9 08/28/2018    CHLORIDE 103 08/28/2018    CO2 24 08/28/2018    GLUCOSE 377 (H) 08/28/2018    BUN 23 (H) 08/28/2018    CREATININE 1.48 (H) 08/28/2018        Lab Results   Component Value Date    WBC 7.1 08/28/2018    HEMOGLOBIN 13.3 08/28/2018    HEMATOCRIT 44.3 08/28/2018     PLATELETCT 204 08/28/2018        Total time of the discharge process = 32 minutes.

## 2018-08-30 ENCOUNTER — PATIENT OUTREACH (OUTPATIENT)
Dept: HEALTH INFORMATION MANAGEMENT | Facility: OTHER | Age: 43
End: 2018-08-30

## 2019-10-07 NOTE — ED NOTES
Bedside report given to Jennifer. Pt transported to floor on monitor by RN and tech   Routing refill request to provider for review/approval because:  Madhuri given x1 and patient did not follow up, please advise  Labs out of range:  BP  Patient needs to be seen because it has been more than 1 year since last office visit.    Bettye Hernandez, WILFREDN, RN  [facility]